# Patient Record
Sex: MALE | Race: WHITE | Employment: OTHER | URBAN - METROPOLITAN AREA
[De-identification: names, ages, dates, MRNs, and addresses within clinical notes are randomized per-mention and may not be internally consistent; named-entity substitution may affect disease eponyms.]

---

## 2023-04-07 ENCOUNTER — APPOINTMENT (OUTPATIENT)
Dept: GENERAL RADIOLOGY | Age: 43
End: 2023-04-07
Attending: EMERGENCY MEDICINE
Payer: OTHER GOVERNMENT

## 2023-04-07 ENCOUNTER — HOSPITAL ENCOUNTER (OUTPATIENT)
Age: 43
End: 2023-04-07
Attending: EMERGENCY MEDICINE
Payer: OTHER GOVERNMENT

## 2024-12-30 NOTE — PRE-PROCEDURE INSTRUCTIONS
PAT phone call made to patient. Patient verbalized understanding of instructions and arrival time of 8 am.

## 2025-01-03 ENCOUNTER — ANESTHESIA EVENT (OUTPATIENT)
Facility: HOSPITAL | Age: 45
End: 2025-01-03
Payer: OTHER GOVERNMENT

## 2025-01-03 ENCOUNTER — HOSPITAL ENCOUNTER (OUTPATIENT)
Facility: HOSPITAL | Age: 45
Discharge: HOME | End: 2025-01-03
Attending: INTERNAL MEDICINE | Admitting: INTERNAL MEDICINE
Payer: OTHER GOVERNMENT

## 2025-01-03 ENCOUNTER — ANESTHESIA (OUTPATIENT)
Facility: HOSPITAL | Age: 45
End: 2025-01-03
Payer: OTHER GOVERNMENT

## 2025-01-03 VITALS
HEIGHT: 67 IN | OXYGEN SATURATION: 98 % | RESPIRATION RATE: 18 BRPM | SYSTOLIC BLOOD PRESSURE: 107 MMHG | DIASTOLIC BLOOD PRESSURE: 73 MMHG | BODY MASS INDEX: 29.82 KG/M2 | HEART RATE: 88 BPM | WEIGHT: 190 LBS | TEMPERATURE: 97.4 F

## 2025-01-03 DIAGNOSIS — Z12.11 COLON CANCER SCREENING: ICD-10-CM

## 2025-01-03 DIAGNOSIS — K62.5 RECTAL BLEEDING: ICD-10-CM

## 2025-01-03 PROCEDURE — 3600007512: Performed by: INTERNAL MEDICINE

## 2025-01-03 PROCEDURE — 6360000002 HC RX W HCPCS: Performed by: NURSE ANESTHETIST, CERTIFIED REGISTERED

## 2025-01-03 PROCEDURE — 2709999900 HC NON-CHARGEABLE SUPPLY: Performed by: INTERNAL MEDICINE

## 2025-01-03 PROCEDURE — 7100000010 HC PHASE II RECOVERY - FIRST 15 MIN: Performed by: INTERNAL MEDICINE

## 2025-01-03 PROCEDURE — 7100000011 HC PHASE II RECOVERY - ADDTL 15 MIN: Performed by: INTERNAL MEDICINE

## 2025-01-03 PROCEDURE — 2500000003 HC RX 250 WO HCPCS: Performed by: NURSE ANESTHETIST, CERTIFIED REGISTERED

## 2025-01-03 PROCEDURE — 6360000002 HC RX W HCPCS: Performed by: ANESTHESIOLOGY

## 2025-01-03 PROCEDURE — 3600007502: Performed by: INTERNAL MEDICINE

## 2025-01-03 PROCEDURE — 3700000000 HC ANESTHESIA ATTENDED CARE: Performed by: INTERNAL MEDICINE

## 2025-01-03 PROCEDURE — 3700000001 HC ADD 15 MINUTES (ANESTHESIA): Performed by: INTERNAL MEDICINE

## 2025-01-03 PROCEDURE — 88305 TISSUE EXAM BY PATHOLOGIST: CPT

## 2025-01-03 RX ORDER — DEXMEDETOMIDINE HYDROCHLORIDE 100 UG/ML
INJECTION, SOLUTION INTRAVENOUS
Status: COMPLETED
Start: 2025-01-03 | End: 2025-01-03

## 2025-01-03 RX ORDER — DEXMEDETOMIDINE HYDROCHLORIDE 100 UG/ML
INJECTION, SOLUTION INTRAVENOUS
Status: DISCONTINUED | OUTPATIENT
Start: 2025-01-03 | End: 2025-01-03 | Stop reason: SDUPTHER

## 2025-01-03 RX ORDER — LIDOCAINE HYDROCHLORIDE 20 MG/ML
INJECTION, SOLUTION INTRAVENOUS
Status: DISCONTINUED
Start: 2025-01-03 | End: 2025-01-03 | Stop reason: HOSPADM

## 2025-01-03 RX ORDER — GLYCOPYRROLATE 0.2 MG/ML
INJECTION INTRAMUSCULAR; INTRAVENOUS
Status: COMPLETED
Start: 2025-01-03 | End: 2025-01-03

## 2025-01-03 RX ORDER — DEXMEDETOMIDINE HYDROCHLORIDE 100 UG/ML
INJECTION, SOLUTION INTRAVENOUS
Status: DISCONTINUED | OUTPATIENT
Start: 2025-01-03 | End: 2025-01-03

## 2025-01-03 RX ORDER — PROPOFOL 10 MG/ML
INJECTION, EMULSION INTRAVENOUS
Status: COMPLETED
Start: 2025-01-03 | End: 2025-01-03

## 2025-01-03 RX ORDER — HYDRALAZINE HYDROCHLORIDE 20 MG/ML
10 INJECTION INTRAMUSCULAR; INTRAVENOUS ONCE
Status: COMPLETED | OUTPATIENT
Start: 2025-01-03 | End: 2025-01-03

## 2025-01-03 RX ORDER — AMLODIPINE BESYLATE 10 MG/1
10 TABLET ORAL
COMMUNITY
Start: 2024-10-29

## 2025-01-03 RX ORDER — GLYCOPYRROLATE 0.2 MG/ML
INJECTION INTRAMUSCULAR; INTRAVENOUS
Status: DISCONTINUED | OUTPATIENT
Start: 2025-01-03 | End: 2025-01-03 | Stop reason: SDUPTHER

## 2025-01-03 RX ORDER — HYDROXYCHLOROQUINE SULFATE 200 MG/1
TABLET, FILM COATED ORAL DAILY
COMMUNITY

## 2025-01-03 RX ADMIN — GLYCOPYRROLATE 0.2 MG: 0.2 INJECTION, SOLUTION INTRAMUSCULAR; INTRAVENOUS at 10:09

## 2025-01-03 RX ADMIN — PROPOFOL 100 MG: 10 INJECTION, EMULSION INTRAVENOUS at 10:18

## 2025-01-03 RX ADMIN — PROPOFOL 100 MG: 10 INJECTION, EMULSION INTRAVENOUS at 10:16

## 2025-01-03 RX ADMIN — DEXMEDETOMIDINE 20 MCG: 100 INJECTION, SOLUTION INTRAVENOUS at 10:13

## 2025-01-03 RX ADMIN — PROPOFOL 50 MG: 10 INJECTION, EMULSION INTRAVENOUS at 10:21

## 2025-01-03 RX ADMIN — HYDRALAZINE HYDROCHLORIDE 10 MG: 20 INJECTION INTRAMUSCULAR; INTRAVENOUS at 09:54

## 2025-01-03 ASSESSMENT — PAIN - FUNCTIONAL ASSESSMENT
PAIN_FUNCTIONAL_ASSESSMENT: 0-10
PAIN_FUNCTIONAL_ASSESSMENT: 0-10
PAIN_FUNCTIONAL_ASSESSMENT: NONE - DENIES PAIN

## 2025-01-03 NOTE — PROGRESS NOTES
Discharge instructions printed and provided to patient and wife, Crystal with all questions answered in full. PIV x1 removed with cath tip intact. Pt VSS and no signs of distress noted. Pt tolerating liquids with no issues. Pt ambulating within room with no issues. Pt wheeled down to main entrance accompanied by staff. Pt condition stable at time of discharge.

## 2025-01-03 NOTE — ANESTHESIA POSTPROCEDURE EVALUATION
Department of Anesthesiology  Postprocedure Note    Patient: Santino Salgado  MRN: [id number]  YOB: 1980  Date of evaluation: 1/3/2025    Procedure Summary       Date: 01/03/25 Room / Location: [facility] ENDO 02 / [facility]    Anesthesia Start: 1007 Anesthesia Stop: 1032    Procedure: COLONOSCOPY WITH BIOPSY, POLYPECTOMY (Lower GI Region) Diagnosis:       Colon cancer screening      Rectal bleeding      (Colon cancer screening [Z12.11])      (Rectal bleeding [K62.5])    Surgeons: Aye Alfonso MD Responsible Provider: Ronnie Del Real MD    Anesthesia Type: MAC, TIVA ASA Status: 2            Anesthesia Type: No value filed.    Venkat Phase I: Venkat Score: 10    Venkat Phase II:      Anesthesia Post Evaluation    Patient location during evaluation: bedside (Endoscopy Unit)  Patient participation: complete - patient participated  Level of consciousness: sleepy but conscious  Pain score: 0  Airway patency: patent  Nausea & Vomiting: no nausea and no vomiting  Cardiovascular status: hemodynamically stable  Respiratory status: acceptable  Hydration status: stable  Comments: This patient remained on the stretcher.  The patient was handed off to the endoscopy nursing team.  All questions regarding pre-, intra-, and postoperative care were answered.  Multimodal analgesia pain management approach    No notable events documented.

## 2025-01-03 NOTE — DISCHARGE INSTRUCTIONS
Return to normal activities tomorrow  Await pathology results  Repeat colonoscopy in 5 years for surveillance  If rectal bleeding persistent despite of medical treatment, need hemorrhoid bandings

## 2025-01-03 NOTE — PERIOP NOTE
Patient BP elevated at 153/111. Dr. Del Real with anesthesia notified and orders received for Hydralazine 10 mg IV ONCE. Completed as ordered.

## 2025-01-03 NOTE — ANESTHESIA PRE PROCEDURE
Department of Anesthesiology  Preprocedure Note       Name:  Santino Salgado   Age:  44 y.o.  :  1980                                          MRN:  [id number]         Date:  1/3/2025      Surgeon: Surgeon(s):  Aye Alfonso MD    Procedure: Procedure(s):  COLONOSCOPY WITH BIOPSY, POLYPECTOMY    Medications prior to admission:   Prior to Admission medications    Medication Sig Start Date End Date Taking? Authorizing Provider   amLODIPine (NORVASC) 10 MG tablet Take 1 tablet by mouth 10/29/24  Yes ProviderLesly MD   hydroxychloroquine (PLAQUENIL) 200 MG tablet Take by mouth daily   Yes Provider, MD Lesly       Current medications:    Current Facility-Administered Medications   Medication Dose Route Frequency Provider Last Rate Last Admin    lidocaine (cardiac) (XYLOCAINE) 100 MG/5ML injection             propofol 200 MG/20ML infusion             dexmedeTOMIDine (PRECEDEX) 200 MCG/2ML injection                Allergies:  No Known Allergies    Problem List:    Patient Active Problem List   Diagnosis Code    Special screening for malignant neoplasms, colon Z12.11       Past Medical History:        Diagnosis Date    HTN (hypertension)     Lupus (systemic lupus erythematosus) (HCC)        Past Surgical History:        Procedure Laterality Date    EYE SURGERY      PRK-lasik       Social History:    Social History     Tobacco Use    Smoking status: Never    Smokeless tobacco: Never   Substance Use Topics    Alcohol use: Never                                Counseling given: Not Answered      Vital Signs (Current):   Vitals:    24 1250 25 0900   BP:  (!) 153/111   Pulse:  85   Resp:  18   Temp:  97.5 °F (36.4 °C)   TempSrc:  Oral   SpO2:  96%   Weight: 86.2 kg (190 lb)    Height: 1.702 m (5' 7\")                                               BP Readings from Last 3 Encounters:   25 (!) 153/111       NPO Status: Time of last liquid consumption: 2200                        Time of last

## (undated) DEVICE — FORCEPS BX 240CM 2.4MM L NDL RAD JAW 4 M00513334

## (undated) DEVICE — LINE SAMPLING AD NSL O2 TBNG MICROSTREAM ADV FILTERLINE MVAO

## (undated) DEVICE — MASK ANES INF SZ 2 PREM TAIL VLV INFL PRT UNSCENTED SGL PT

## (undated) DEVICE — MASK O2 MED AD 7 FT 3 IN 1 W/ STD CONN LTX

## (undated) DEVICE — KIT ENDOSCOPIC  PROC VIA